# Patient Record
Sex: MALE | Race: BLACK OR AFRICAN AMERICAN | URBAN - NONMETROPOLITAN AREA
[De-identification: names, ages, dates, MRNs, and addresses within clinical notes are randomized per-mention and may not be internally consistent; named-entity substitution may affect disease eponyms.]

---

## 2019-09-12 ENCOUNTER — IMPORTED ENCOUNTER (OUTPATIENT)
Dept: URBAN - NONMETROPOLITAN AREA CLINIC 1 | Facility: CLINIC | Age: 44
End: 2019-09-12

## 2019-09-12 PROBLEM — H18.231: Noted: 2019-09-12

## 2019-09-12 PROBLEM — H16.141: Noted: 2019-09-12

## 2019-09-12 PROCEDURE — 99203 OFFICE O/P NEW LOW 30 MIN: CPT

## 2019-09-12 NOTE — PATIENT DISCUSSION
Keratitis Corneal Edema OD-  Discussed findings w/ pt today-  Signs/symptoms associated discussed-  Patient states FB was removed by doctor at urgent care-  Tracking noted on corneal surface today but no residual FB-  Start Zylet OD QID sample given-  Recommended not working today-  RTC tomorrow

## 2019-09-13 ENCOUNTER — IMPORTED ENCOUNTER (OUTPATIENT)
Dept: URBAN - NONMETROPOLITAN AREA CLINIC 1 | Facility: CLINIC | Age: 44
End: 2019-09-13

## 2019-09-13 PROCEDURE — 99213 OFFICE O/P EST LOW 20 MIN: CPT

## 2019-09-13 NOTE — PATIENT DISCUSSION
Keratitis Corneal Edema OD (Improved 75%)-  Discussed findings w/ pt today-  Signs/symptoms associated discussed-  Continue Zylet OD TID until gone -  May return back to work today-  RTC PRN

## 2022-04-09 ASSESSMENT — VISUAL ACUITY
OS_CC: 20/20
OD_CC: 20/32-
OD_CC: 20/200
OD_PH: 20/30-
OS_CC: 20/20